# Patient Record
Sex: FEMALE | Race: WHITE | Employment: STUDENT | URBAN - METROPOLITAN AREA
[De-identification: names, ages, dates, MRNs, and addresses within clinical notes are randomized per-mention and may not be internally consistent; named-entity substitution may affect disease eponyms.]

---

## 2017-01-24 ENCOUNTER — OFFICE VISIT (OUTPATIENT)
Dept: FAMILY MEDICINE CLINIC | Facility: CLINIC | Age: 37
End: 2017-01-24

## 2017-01-24 ENCOUNTER — TELEPHONE (OUTPATIENT)
Dept: FAMILY MEDICINE CLINIC | Facility: CLINIC | Age: 37
End: 2017-01-24

## 2017-01-24 VITALS
DIASTOLIC BLOOD PRESSURE: 76 MMHG | SYSTOLIC BLOOD PRESSURE: 108 MMHG | HEART RATE: 83 BPM | WEIGHT: 139 LBS | BODY MASS INDEX: 28 KG/M2 | TEMPERATURE: 98 F

## 2017-01-24 DIAGNOSIS — J01.00 ACUTE MAXILLARY SINUSITIS, RECURRENCE NOT SPECIFIED: Primary | ICD-10-CM

## 2017-01-24 DIAGNOSIS — J02.9 SORE THROAT: ICD-10-CM

## 2017-01-24 LAB
CONTROL LINE PRESENT WITH A CLEAR BACKGROUND (YES/NO): YES YES/NO
KIT LOT #: NORMAL NUMERIC
STREP GRP A CUL-SCR: NEGATIVE

## 2017-01-24 PROCEDURE — 87880 STREP A ASSAY W/OPTIC: CPT | Performed by: FAMILY MEDICINE

## 2017-01-24 PROCEDURE — 99214 OFFICE O/P EST MOD 30 MIN: CPT | Performed by: FAMILY MEDICINE

## 2017-01-24 PROCEDURE — 99212 OFFICE O/P EST SF 10 MIN: CPT | Performed by: FAMILY MEDICINE

## 2017-01-24 RX ORDER — ONDANSETRON 4 MG/1
4 TABLET, ORALLY DISINTEGRATING ORAL EVERY 8 HOURS PRN
Qty: 10 TABLET | Refills: 0 | Status: SHIPPED | OUTPATIENT
Start: 2017-01-24 | End: 2017-07-08 | Stop reason: ALTCHOICE

## 2017-01-24 RX ORDER — ERYTHROMYCIN 500 MG/1
500 TABLET, COATED ORAL
Qty: 20 TABLET | Refills: 0 | Status: SHIPPED | OUTPATIENT
Start: 2017-01-24 | End: 2017-07-08 | Stop reason: ALTCHOICE

## 2017-01-24 RX ORDER — BENZONATATE 200 MG/1
200 CAPSULE ORAL 3 TIMES DAILY PRN
Qty: 30 CAPSULE | Refills: 0 | Status: SHIPPED | OUTPATIENT
Start: 2017-01-24 | End: 2017-07-08 | Stop reason: ALTCHOICE

## 2017-01-24 NOTE — PROGRESS NOTES
2017 11:34 AM    Josh Medina, : 1980  Patient presents with:  Sinus Problem: X 3 weeks off and on  Cough  Sore Throat      HPI:     Josh Medina is a 31year old female who presents for evaluation of a chief complaint of runny nose, sore th reviewed. No pertinent past surgical history.     Social History:     Social History   Marital Status: Unknown  Spouse Name: N/A    Years of Education: N/A  Number of Children: N/A     Occupational History  None on file     Social History Main Topics   Smok supple, no adenopathy, no thyromegaly  CARDIO: RRR without murmur  EXTREMITIES: no cyanosis, clubbing or edema  GI: soft, non-tender, normal bowel sounds  HEAD: normocephalic, atraumatic  EYES: sclera non icteric bilateral, conjunctiva clear  EARS: TM  luis felipe Encounter  POC Rapid Strep [id number]      RECOMMENDATIONS given include: Please, call our office with any questions or concerns. Notify the doctor if there is a deterioration or worsening of the medical condition.  Also, inform the doctor with any new symptom

## 2017-01-24 NOTE — TELEPHONE ENCOUNTER
Pt calling regarding having a questing on direction for erythromycin base 500 MG Oral Tab. Pt state that she was told to take 2 tablets twice a day but direction on the bottle says take 1 tablet 4 time a day. Pt would like to know which way is correct. Manjinder Castrejon

## 2017-01-25 NOTE — TELEPHONE ENCOUNTER
Pt is calling back state that she will be available at 11:30 pt state if she don't answer please leave the instruction on how to take the medication on her VM please

## 2017-01-25 NOTE — TELEPHONE ENCOUNTER
Pt. Calling back requesting to speak to RN to get clarification on directions for taking antibiotic.

## 2017-02-15 ENCOUNTER — TELEPHONE (OUTPATIENT)
Dept: GASTROENTEROLOGY | Facility: CLINIC | Age: 37
End: 2017-02-15

## 2017-02-15 NOTE — TELEPHONE ENCOUNTER
Pt states that she needs to be seen sooner than first available to review balsalazide medication. She states she has been tapering down medication and is taking two pills three times daily and is doing well. Please call.

## 2017-02-15 NOTE — TELEPHONE ENCOUNTER
Dr. Elton Lawson see below. Pt was seen in [address] [facility] on 10. 7.16 (notes below). This would be 4 month follow-up at this point. There are no current openings. Would you like to add on to a 15 min slot on FRI 2.24.17 at 11:30? Pls advise.      Copied per OV note

## 2017-03-27 RX ORDER — BALSALAZIDE DISODIUM 750 MG/1
1500 CAPSULE ORAL 3 TIMES DAILY
Qty: 180 CAPSULE | Refills: 2 | Status: SHIPPED | OUTPATIENT
Start: 2017-03-27 | End: 2017-08-11

## 2017-03-27 NOTE — TELEPHONE ENCOUNTER
Pending Prescriptions Disp Refills    BALSALAZIDE DISODIUM 750 MG Oral Cap [Pharmacy Med Name: BALSALAZIDE DISODIUM 750 MG CP] 270 capsule 5     Sig: TAKE 3 CAPSULES BY MOUTH 3 TIMES A DAY         See refill request  Patient last seen 10-7-16.    [facility]

## 2017-03-27 NOTE — TELEPHONE ENCOUNTER
Pt contacted and made aware of the medication dosage change, she states she had already discussed this with MD and was already aware. She accepted f/u appt with [facility] on 6/16/17 @1:30pm, directions provided to the [facility] [employer].       Refill Request        Conner Michel

## 2017-06-16 ENCOUNTER — OFFICE VISIT (OUTPATIENT)
Dept: GASTROENTEROLOGY | Facility: CLINIC | Age: 37
End: 2017-06-16

## 2017-06-16 VITALS
BODY MASS INDEX: 25.44 KG/M2 | DIASTOLIC BLOOD PRESSURE: 70 MMHG | HEIGHT: 60.5 IN | WEIGHT: 133 LBS | SYSTOLIC BLOOD PRESSURE: 98 MMHG | HEART RATE: 87 BPM

## 2017-06-16 DIAGNOSIS — K51.811 OTHER ULCERATIVE COLITIS WITH RECTAL BLEEDING (HCC): ICD-10-CM

## 2017-06-16 DIAGNOSIS — R11.2 NAUSEA AND VOMITING, INTRACTABILITY OF VOMITING NOT SPECIFIED, UNSPECIFIED VOMITING TYPE: Primary | ICD-10-CM

## 2017-06-16 PROCEDURE — 99213 OFFICE O/P EST LOW 20 MIN: CPT | Performed by: INTERNAL MEDICINE

## 2017-06-16 PROCEDURE — 99212 OFFICE O/P EST SF 10 MIN: CPT | Performed by: INTERNAL MEDICINE

## 2017-06-16 RX ORDER — NORGESTIMATE AND ETHINYL ESTRADIOL 7DAYSX3 28
KIT ORAL
Refills: 6 | COMMUNITY
Start: 2017-05-26 | End: 2018-05-31 | Stop reason: ALTCHOICE

## 2017-06-16 RX ORDER — ONDANSETRON 4 MG/1
4 TABLET, ORALLY DISINTEGRATING ORAL EVERY 12 HOURS PRN
Qty: 60 TABLET | Refills: 1 | Status: SHIPPED | OUTPATIENT
Start: 2017-06-16 | End: 2017-07-08 | Stop reason: ALTCHOICE

## 2017-06-16 RX ORDER — POLYETHYLENE GLYCOL 3350, SODIUM CHLORIDE, SODIUM BICARBONATE, POTASSIUM CHLORIDE 420; 11.2; 5.72; 1.48 G/4L; G/4L; G/4L; G/4L
POWDER, FOR SOLUTION ORAL
Qty: 1 BOTTLE | Refills: 0 | Status: SHIPPED | OUTPATIENT
Start: 2017-06-16 | End: 2017-07-08 | Stop reason: ALTCHOICE

## 2017-06-16 RX ORDER — ALPRAZOLAM 0.25 MG/1
TABLET ORAL
Refills: 0 | COMMUNITY
Start: 2017-04-27

## 2017-06-16 NOTE — PATIENT INSTRUCTIONS
1. Schedule colonoscopy with IV sedation    2.  bowel prep from pharmacy (split trilyte)    3. Continue all medications for procedure    4. Read all bowel prep instructions carefully    5.  AVOID seeds, nuts, popcorn, raw fruits and vegetables (ahrrison

## 2017-06-16 NOTE — PROGRESS NOTES
[facility] Follow-up Visit    Shanthi crypt abscess  -sigmoid/rectum:  arked chronic active colitis with acute cryptitis, crypt abscess    Soc:  -No smoking/etoh  -has 4 children Dannial Overall is youngest)    Imaging:  CT A/P (6/29/16)  -spleen, panc, adrenals kidneys unremarkable  -diffuse thickeni Oral Tablet Dispersible Take 1 tablet (4 mg total) by mouth every 8 (eight) hours as needed for Nausea. Disp: 10 tablet Rfl: 0   Dicyclomine HCl 10 MG Oral Cap Take 1 capsule (10 mg total) by mouth 3 (three) times daily as needed (cramping).  Disp: 90 capsu of  L sided ulcerative colitis (dx 7/2016). She has only been tx with IV/PO steroids and now on balsalazide 750mg PO (2 tabs TID). Cdiff was tx few months ago. Currently entirely asx, having formed BM without bloody.  TPMT (normal genotype/enzyme activity) Referrals:  US ABDOMEN LIMITED (CPT=[id number])     6/16/2017    DISHA Reese MD  Pager: [phone]

## 2017-06-19 ENCOUNTER — TELEPHONE (OUTPATIENT)
Dept: GASTROENTEROLOGY | Facility: CLINIC | Age: 37
End: 2017-06-19

## 2017-06-19 NOTE — TELEPHONE ENCOUNTER
Scheduled for:  Colonoscopy 32312  Provider Name: Rajeev Munoz  Date:  Zackery Mcgee 7/18/17  Location:  [facility]  Sedation:  IV  Time:  10;00 am  Prep: split dose trilyte  Meds/Allergies Reconciled?:  PCN, sertraline, vicodin  Diagnosis with codes:  Ulcerative colitis with re

## 2017-07-08 ENCOUNTER — LAB ENCOUNTER (OUTPATIENT)
Dept: LAB | Age: 37
End: 2017-07-08
Attending: INTERNAL MEDICINE
Payer: COMMERCIAL

## 2017-07-08 ENCOUNTER — OFFICE VISIT (OUTPATIENT)
Dept: FAMILY MEDICINE CLINIC | Facility: CLINIC | Age: 37
End: 2017-07-08

## 2017-07-08 VITALS
TEMPERATURE: 98 F | BODY MASS INDEX: 25 KG/M2 | HEART RATE: 92 BPM | SYSTOLIC BLOOD PRESSURE: 113 MMHG | DIASTOLIC BLOOD PRESSURE: 76 MMHG | WEIGHT: 132 LBS

## 2017-07-08 DIAGNOSIS — L42 PITYRIASIS ROSEA: Primary | ICD-10-CM

## 2017-07-08 DIAGNOSIS — K51.811 OTHER ULCERATIVE COLITIS WITH RECTAL BLEEDING (HCC): ICD-10-CM

## 2017-07-08 DIAGNOSIS — R11.2 NAUSEA AND VOMITING, INTRACTABILITY OF VOMITING NOT SPECIFIED, UNSPECIFIED VOMITING TYPE: ICD-10-CM

## 2017-07-08 DIAGNOSIS — B36.0 TINEA VERSICOLOR: ICD-10-CM

## 2017-07-08 LAB
ALBUMIN SERPL BCP-MCNC: 3.7 G/DL (ref 3.5–4.8)
ALBUMIN/GLOB SERPL: 1.2 {RATIO} (ref 1–2)
ALP SERPL-CCNC: 41 U/L (ref 32–100)
ALT SERPL-CCNC: 10 U/L (ref 14–54)
ANION GAP SERPL CALC-SCNC: 6 MMOL/L (ref 0–18)
AST SERPL-CCNC: 14 U/L (ref 15–41)
BASOPHILS # BLD: 0 K/UL (ref 0–0.2)
BASOPHILS NFR BLD: 1 %
BILIRUB SERPL-MCNC: 0.4 MG/DL (ref 0.3–1.2)
BUN SERPL-MCNC: 6 MG/DL (ref 8–20)
BUN/CREAT SERPL: 8.2 (ref 10–20)
CALCIUM SERPL-MCNC: 8.8 MG/DL (ref 8.5–10.5)
CHLORIDE SERPL-SCNC: 103 MMOL/L (ref 95–110)
CO2 SERPL-SCNC: 27 MMOL/L (ref 22–32)
CREAT SERPL-MCNC: 0.73 MG/DL (ref 0.5–1.5)
EOSINOPHIL # BLD: 0.1 K/UL (ref 0–0.7)
EOSINOPHIL NFR BLD: 2 %
ERYTHROCYTE [DISTWIDTH] IN BLOOD BY AUTOMATED COUNT: 13 % (ref 11–15)
GLOBULIN PLAS-MCNC: 3.2 G/DL (ref 2.5–3.7)
GLUCOSE SERPL-MCNC: 85 MG/DL (ref 70–99)
HCT VFR BLD AUTO: 37.8 % (ref 35–48)
HGB BLD-MCNC: 12.6 G/DL (ref 12–16)
LYMPHOCYTES # BLD: 1.9 K/UL (ref 1–4)
LYMPHOCYTES NFR BLD: 51 %
MCH RBC QN AUTO: 29.3 PG (ref 27–32)
MCHC RBC AUTO-ENTMCNC: 33.4 G/DL (ref 32–37)
MCV RBC AUTO: 87.7 FL (ref 80–100)
MONOCYTES # BLD: 0.3 K/UL (ref 0–1)
MONOCYTES NFR BLD: 7 %
NEUTROPHILS # BLD AUTO: 1.5 K/UL (ref 1.8–7.7)
NEUTROPHILS NFR BLD: 40 %
OSMOLALITY UR CALC.SUM OF ELEC: 279 MOSM/KG (ref 275–295)
PLATELET # BLD AUTO: 221 K/UL (ref 140–400)
PMV BLD AUTO: 9.8 FL (ref 7.4–10.3)
POTASSIUM SERPL-SCNC: 4 MMOL/L (ref 3.3–5.1)
PROT SERPL-MCNC: 6.9 G/DL (ref 5.9–8.4)
RBC # BLD AUTO: 4.31 M/UL (ref 3.7–5.4)
SODIUM SERPL-SCNC: 136 MMOL/L (ref 136–144)
VIT B12 SERPL-MCNC: 360 PG/ML (ref 181–914)
WBC # BLD AUTO: 3.7 K/UL (ref 4–11)

## 2017-07-08 PROCEDURE — 99212 OFFICE O/P EST SF 10 MIN: CPT | Performed by: FAMILY MEDICINE

## 2017-07-08 PROCEDURE — 36415 COLL VENOUS BLD VENIPUNCTURE: CPT

## 2017-07-08 PROCEDURE — 82607 VITAMIN B-12: CPT

## 2017-07-08 PROCEDURE — 87340 HEPATITIS B SURFACE AG IA: CPT

## 2017-07-08 PROCEDURE — 82306 VITAMIN D 25 HYDROXY: CPT

## 2017-07-08 PROCEDURE — 85025 COMPLETE CBC W/AUTO DIFF WBC: CPT

## 2017-07-08 PROCEDURE — 99214 OFFICE O/P EST MOD 30 MIN: CPT | Performed by: FAMILY MEDICINE

## 2017-07-08 PROCEDURE — 80053 COMPREHEN METABOLIC PANEL: CPT

## 2017-07-08 RX ORDER — EPINEPHRINE 0.3 MG/.3ML
0.3 INJECTION SUBCUTANEOUS ONCE
Qty: 1 EACH | Refills: 3 | Status: SHIPPED | OUTPATIENT
Start: 2017-07-08 | End: 2017-07-08

## 2017-07-08 RX ORDER — KETOCONAZOLE 20 MG/G
CREAM TOPICAL
Qty: 60 G | Refills: 1 | Status: SHIPPED | OUTPATIENT
Start: 2017-07-08 | End: 2017-07-08

## 2017-07-08 RX ORDER — KETOCONAZOLE 20 MG/G
CREAM TOPICAL
Qty: 60 G | Refills: 1 | Status: SHIPPED | OUTPATIENT
Start: 2017-07-08 | End: 2018-10-09

## 2017-07-08 NOTE — PROGRESS NOTES
7/8/2017  11:58 AM    Zahra Crowell is a 30year old female. Chief complaint(s): Patient presents with:  Rash: diffuse areas she has seen a dermatologist in the past but rash keeps getting worse.      HPI:     Zahra Crowell primary complaint is regar Medications (Active prior to today's visit):    Current Outpatient Prescriptions:  Halobetasol Propionate 0.05 % External Cream Apply to affected area(s) BID. Disp: 60 g Rfl: 1   ketoconazole 2 % External Cream Apply to affected area BID.  Buttocks and normal.   Left Ear: External ear normal.   Nose: No rhinorrhea. Mouth/Throat: Oropharynx is clear and moist.   Eyes: Conjunctivae are normal.   Neck: Neck supple. Cardiovascular: Normal rate and regular rhythm.     Pulmonary/Chest: Effort normal and uzma 2. Tinea versicolor  MEDICATIONS:     Signed Prescriptions Disp Refills    Halobetasol Propionate 0.05 % External Cream 60 g 1      Sig: Apply to affected area(s) BID.      ketoconazole 2 % External Cream 60 g 1      Sig: Apply to affected area BID.

## 2017-07-10 ENCOUNTER — TELEPHONE (OUTPATIENT)
Dept: FAMILY MEDICINE CLINIC | Facility: CLINIC | Age: 37
End: 2017-07-10

## 2017-07-10 LAB
25(OH)D3 SERPL-MCNC: 23.5 NG/ML
HBV SURFACE AG SERPL QL IA: NONREACTIVE

## 2017-07-10 NOTE — TELEPHONE ENCOUNTER
Actions Requested: advice needed  Problem: rash is spreading since recent office visit  Onset and Timin weeks  Associated Symptoms: red areas spreading to abdomen, legs, feet, nausea, fatigue  Aggravating by: n/a  Alleviated by: cold oatmeal baths  Tri

## 2017-07-10 NOTE — TELEPHONE ENCOUNTER
Patient states she was told by pharmacy that this med needs prior authorization    Halobetasol Propionate 0.05 % External Cream 60 g 1 7/8/2017    Sig :  Apply to affected area(s) BID.      Route:   (none)     Order #: [id number]

## 2017-07-11 ENCOUNTER — TELEPHONE (OUTPATIENT)
Dept: GASTROENTEROLOGY | Facility: CLINIC | Age: 37
End: 2017-07-11

## 2017-07-11 RX ORDER — ERGOCALCIFEROL 1.25 MG/1
50000 CAPSULE ORAL
Qty: 8 CAPSULE | Refills: 0 | Status: SHIPPED | OUTPATIENT
Start: 2017-07-11 | End: 2017-08-26

## 2017-07-11 NOTE — TELEPHONE ENCOUNTER
D/W patient --> low Vit D, will replace with weekly vit D therapy. She has a rash which she says comes and goes, has had it in the past. Is going to see dermatologist. No SOB. Okay to get c-scope next week.

## 2017-07-11 NOTE — TELEPHONE ENCOUNTER
Dr. Rivas Ser called pharmacy and they stated Merril May is a similar medication to halobetasol propionate.  Please advise on sending prescription for patient to her pharmacy

## 2017-07-11 NOTE — TELEPHONE ENCOUNTER
Spoke to pt, informed her of Dr. bhakta. She states that she did ask for the soonest appt and has called every office that was given to her by her insurance company but has not been able to get an appt sooner than 8/28.  Pt is on a waiting list at

## 2017-07-13 NOTE — TELEPHONE ENCOUNTER
Called [facility] pharmacy and RM changed medication to Fluocinonide 005% cream. No PA required.

## 2017-07-18 ENCOUNTER — HOSPITAL ENCOUNTER (OUTPATIENT)
Facility: HOSPITAL | Age: 37
Setting detail: HOSPITAL OUTPATIENT SURGERY
Discharge: HOME | End: 2017-07-18
Attending: INTERNAL MEDICINE | Admitting: INTERNAL MEDICINE
Payer: COMMERCIAL

## 2017-07-18 ENCOUNTER — SURGERY (OUTPATIENT)
Age: 37
End: 2017-07-18

## 2017-07-18 DIAGNOSIS — K62.5 RECTAL BLEEDING: ICD-10-CM

## 2017-07-18 DIAGNOSIS — K51.90 ULCERATIVE COLITIS (HCC): ICD-10-CM

## 2017-07-18 LAB — B-HCG UR QL: NEGATIVE

## 2017-07-18 PROCEDURE — 0DBK8ZX EXCISION OF ASCENDING COLON, VIA NATURAL OR ARTIFICIAL OPENING ENDOSCOPIC, DIAGNOSTIC: ICD-10-PCS | Performed by: INTERNAL MEDICINE

## 2017-07-18 PROCEDURE — 0DBG8ZX EXCISION OF LEFT LARGE INTESTINE, VIA NATURAL OR ARTIFICIAL OPENING ENDOSCOPIC, DIAGNOSTIC: ICD-10-PCS | Performed by: INTERNAL MEDICINE

## 2017-07-18 PROCEDURE — 45385 COLONOSCOPY W/LESION REMOVAL: CPT | Performed by: INTERNAL MEDICINE

## 2017-07-18 PROCEDURE — 99152 MOD SED SAME PHYS/QHP 5/>YRS: CPT | Performed by: INTERNAL MEDICINE

## 2017-07-18 PROCEDURE — 0DBF8ZX EXCISION OF RIGHT LARGE INTESTINE, VIA NATURAL OR ARTIFICIAL OPENING ENDOSCOPIC, DIAGNOSTIC: ICD-10-PCS | Performed by: INTERNAL MEDICINE

## 2017-07-18 RX ORDER — SODIUM CHLORIDE 0.9 % (FLUSH) 0.9 %
10 SYRINGE (ML) INJECTION AS NEEDED
Status: DISCONTINUED | OUTPATIENT
Start: 2017-07-18 | End: 2017-07-18

## 2017-07-18 RX ORDER — SODIUM CHLORIDE, SODIUM LACTATE, POTASSIUM CHLORIDE, CALCIUM CHLORIDE 600; 310; 30; 20 MG/100ML; MG/100ML; MG/100ML; MG/100ML
INJECTION, SOLUTION INTRAVENOUS CONTINUOUS
Status: DISCONTINUED | OUTPATIENT
Start: 2017-07-18 | End: 2017-07-18

## 2017-07-18 RX ORDER — BALSALAZIDE DISODIUM 750 MG/1
1500 CAPSULE ORAL 3 TIMES DAILY
COMMUNITY
End: 2017-08-11

## 2017-07-18 RX ORDER — MIDAZOLAM HYDROCHLORIDE 1 MG/ML
1 INJECTION INTRAMUSCULAR; INTRAVENOUS EVERY 5 MIN PRN
Status: DISCONTINUED | OUTPATIENT
Start: 2017-07-18 | End: 2017-07-18

## 2017-07-18 RX ORDER — MIDAZOLAM HYDROCHLORIDE 1 MG/ML
INJECTION INTRAMUSCULAR; INTRAVENOUS
Status: DISCONTINUED | OUTPATIENT
Start: 2017-07-18 | End: 2017-07-18

## 2017-07-18 NOTE — H&P
History & Physical Examination    Patient Name: Zoya Roque  MRN: [id number]  [facility]: [id number]  YOB: 1980    Diagnosis: ulcerative colitis      Prescriptions Prior to Admission:  Balsalazide Disodium 750 MG Oral Cap Take 2,250 mg by mouth Anxiety  Vicodin [Hydrocodon*    Hives    Comment:hives    Past Medical History:   Diagnosis Date   • Anxiety    • Clostridium difficile colitis 7/2016   • Colitis    • Ulcerative colitis ([facility] [address]      No past surgical history on file.   Family History   Probl

## 2017-07-18 NOTE — OPERATIVE REPORT
COLONOSCOPY REPORT    Xander Kyle     1980 Age 30year old   PCP Denise Shook MD Endoscopist Anam Cui MD     Date of procedure: 17    Procedure: Colonoscopy w/biopsy, cold snare polypectomy    Pre-operative diagnosis: Hi tolerated the procedure well. Complications: none. Findings:   1. One polyp(s) noted as follows:      A. 4 mm polyp in the ascending colon; flat morphology; cold snare polypectomy and retrieved.     2. Diverticulosis: two diverticulum were noted in t

## 2017-07-19 VITALS
DIASTOLIC BLOOD PRESSURE: 66 MMHG | SYSTOLIC BLOOD PRESSURE: 101 MMHG | OXYGEN SATURATION: 99 % | HEIGHT: 58 IN | RESPIRATION RATE: 17 BRPM | HEART RATE: 60 BPM | BODY MASS INDEX: 27.29 KG/M2 | WEIGHT: 130 LBS

## 2017-07-24 ENCOUNTER — TELEPHONE (OUTPATIENT)
Dept: GASTROENTEROLOGY | Facility: CLINIC | Age: 37
End: 2017-07-24

## 2017-07-24 NOTE — TELEPHONE ENCOUNTER
Left msg for patient re: path results from showing showing no colitis. Polyp removed is hyperplastic. Repeat CLN in 3-5 years.

## 2017-08-11 ENCOUNTER — HOSPITAL ENCOUNTER (OUTPATIENT)
Age: 37
Discharge: HOME | End: 2017-08-11
Attending: EMERGENCY MEDICINE
Payer: COMMERCIAL

## 2017-08-11 VITALS
HEIGHT: 59 IN | DIASTOLIC BLOOD PRESSURE: 76 MMHG | WEIGHT: 133 LBS | TEMPERATURE: 99 F | HEART RATE: 88 BPM | OXYGEN SATURATION: 98 % | BODY MASS INDEX: 26.81 KG/M2 | RESPIRATION RATE: 18 BRPM | SYSTOLIC BLOOD PRESSURE: 114 MMHG

## 2017-08-11 DIAGNOSIS — H10.33 ACUTE CONJUNCTIVITIS OF BOTH EYES, UNSPECIFIED ACUTE CONJUNCTIVITIS TYPE: Primary | ICD-10-CM

## 2017-08-11 PROCEDURE — 99213 OFFICE O/P EST LOW 20 MIN: CPT

## 2017-08-11 RX ORDER — BALSALAZIDE DISODIUM 750 MG/1
1500 CAPSULE ORAL 3 TIMES DAILY
Qty: 180 CAPSULE | Refills: 2 | Status: SHIPPED | OUTPATIENT
Start: 2017-08-11 | End: 2017-11-15

## 2017-08-11 RX ORDER — GENTAMICIN SULFATE 3 MG/ML
2 SOLUTION/ DROPS OPHTHALMIC 4 TIMES DAILY
Qty: 5 ML | Refills: 1 | Status: SHIPPED | OUTPATIENT
Start: 2017-08-11 | End: 2017-08-16

## 2017-08-11 NOTE — TELEPHONE ENCOUNTER
Dr. Cedeño Chi rx request for Balsalazide Disodium 750 mg, Please review. Thank you. LOV: 6/16/17  Last Refill: Rx request Filled by outside Provider.

## 2017-08-11 NOTE — ED PROVIDER NOTES
Patient Seen in: [facility] In [address] [place] [address]    History   Patient presents with: Eye Visual Problem (opthalmic)    Stated Complaint: itchy eyes    HPI    Pt complains of b eye itching, redness and drainage  days. Pain is min.     Lethea Spark Comment: restarted smoking 3 months ago  Alcohol use: Yes           0.0 oz/week     Comment: OCC. Review of Systems    Positive for stated complaint: itchy eyes  Other systems are as noted in HPI.   Constitutional and vital signs rev

## 2017-08-11 NOTE — ED INITIAL ASSESSMENT (HPI)
Presents with bilateral eye itchiness since yesterday. Denies drainage, foreign body sensation. Pt states \"it's worse than my normal allergies. \" Denies new soaps, lotions, foods. Denies blurred vision or changes to vision. Denies cough or URI symptoms.

## 2017-08-28 ENCOUNTER — TELEPHONE (OUTPATIENT)
Dept: GASTROENTEROLOGY | Facility: CLINIC | Age: 37
End: 2017-08-28

## 2017-08-28 RX ORDER — ERGOCALCIFEROL 1.25 MG/1
50000 CAPSULE ORAL
Qty: 8 CAPSULE | Refills: 0 | Status: SHIPPED | OUTPATIENT
Start: 2017-08-28 | End: 2017-10-17

## 2017-08-28 NOTE — TELEPHONE ENCOUNTER
Pending Prescriptions Disp Refills    ERGOCALCIFEROL 82917 units Oral Cap [Pharmacy Med Name: VIT D2 1.25 MG (50,000 UNIT)] 8 capsule 0     Sig: TAKE 1 CAPSULE (50,000 UNITS TOTAL) BY MOUTH EVERY 7 DAYS.          See refill request  Patient last seen 6-16

## 2017-08-28 NOTE — TELEPHONE ENCOUNTER
Pt requesting Vit D results to be sent to her Dermatologist. Rex Mesa call pt at:[phone],thanks.

## 2017-09-27 ENCOUNTER — TELEPHONE (OUTPATIENT)
Dept: FAMILY MEDICINE CLINIC | Facility: CLINIC | Age: 37
End: 2017-09-27

## 2017-09-27 NOTE — TELEPHONE ENCOUNTER
Phone call made no answer left VM that pt needs to schedule an appt so that Dr Benson Paige can place the orders for her TB test. 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  BRITTANIE please help schedule appt if patient

## 2017-09-27 NOTE — TELEPHONE ENCOUNTER
Pt. requesting to get a copy of her px and Immunization record. Pt. Would like to p/up at [facility].

## 2017-09-27 NOTE — TELEPHONE ENCOUNTER
Everything will be addressed at her appt with Silvio Harris.  On previous message patient was left a detailed message to make sure she brings with her immunization record and forms from nursing program.

## 2017-09-27 NOTE — TELEPHONE ENCOUNTER
Pt made an appt with Santos ALEXANDER. Please advise   Pt will need a TB test tomorrow along with her px.

## 2017-09-28 ENCOUNTER — LAB ENCOUNTER (OUTPATIENT)
Dept: LAB | Age: 37
End: 2017-09-28
Attending: NURSE PRACTITIONER
Payer: COMMERCIAL

## 2017-09-28 ENCOUNTER — OFFICE VISIT (OUTPATIENT)
Dept: FAMILY MEDICINE CLINIC | Facility: CLINIC | Age: 37
End: 2017-09-28

## 2017-09-28 ENCOUNTER — TELEPHONE (OUTPATIENT)
Dept: FAMILY MEDICINE CLINIC | Facility: CLINIC | Age: 37
End: 2017-09-28

## 2017-09-28 VITALS
WEIGHT: 141 LBS | SYSTOLIC BLOOD PRESSURE: 109 MMHG | HEIGHT: 60 IN | DIASTOLIC BLOOD PRESSURE: 65 MMHG | BODY MASS INDEX: 27.68 KG/M2 | HEART RATE: 76 BPM

## 2017-09-28 DIAGNOSIS — Z11.1 SCREENING-PULMONARY TB: ICD-10-CM

## 2017-09-28 DIAGNOSIS — Z00.00 WELL ADULT EXAM: Primary | ICD-10-CM

## 2017-09-28 DIAGNOSIS — Z72.0 TOBACCO USE: ICD-10-CM

## 2017-09-28 DIAGNOSIS — K51.00 ULCERATIVE PANCOLITIS WITHOUT COMPLICATION (HCC): ICD-10-CM

## 2017-09-28 DIAGNOSIS — L21.0 PITYRIASIS: ICD-10-CM

## 2017-09-28 DIAGNOSIS — Z00.00 WELL ADULT EXAM: ICD-10-CM

## 2017-09-28 DIAGNOSIS — Z91.030 BEE STING ALLERGY: ICD-10-CM

## 2017-09-28 PROBLEM — Z23 NEEDS FLU SHOT: Status: ACTIVE | Noted: 2017-09-28

## 2017-09-28 LAB — RUBV IGG SER-ACNC: 20.3 IU/ML

## 2017-09-28 PROCEDURE — 86480 TB TEST CELL IMMUN MEASURE: CPT

## 2017-09-28 PROCEDURE — 86735 MUMPS ANTIBODY: CPT

## 2017-09-28 PROCEDURE — 90471 IMMUNIZATION ADMIN: CPT | Performed by: NURSE PRACTITIONER

## 2017-09-28 PROCEDURE — 90686 IIV4 VACC NO PRSV 0.5 ML IM: CPT | Performed by: NURSE PRACTITIONER

## 2017-09-28 PROCEDURE — 86762 RUBELLA ANTIBODY: CPT

## 2017-09-28 PROCEDURE — 99395 PREV VISIT EST AGE 18-39: CPT | Performed by: NURSE PRACTITIONER

## 2017-09-28 PROCEDURE — 86765 RUBEOLA ANTIBODY: CPT

## 2017-09-28 PROCEDURE — 36415 COLL VENOUS BLD VENIPUNCTURE: CPT

## 2017-09-28 PROCEDURE — 86787 VARICELLA-ZOSTER ANTIBODY: CPT

## 2017-09-28 RX ORDER — BALSALAZIDE DISODIUM 750 MG/1
2250 CAPSULE ORAL 3 TIMES DAILY
COMMUNITY
End: 2017-11-16

## 2017-09-28 NOTE — TELEPHONE ENCOUNTER
Pt at the lab and is wondering if she can get the MMR blood draw while she is at the lab. .. please advise

## 2017-09-28 NOTE — PROGRESS NOTES
HPI  Pt here for nursing school physical.  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 Needs flu shot. Is currently under care of GI and derm. No acute complaints at this time. Review of Systems   Constitutional: Negative. HENT: Negative. Eyes: Negative.     Respirato OCP     Other Topics Concern   None on file     Social History Narrative   None on file         Current Outpatient Prescriptions:  Balsalazide Disodium 750 MG Oral Cap Take 2,250 mg by mouth 3 (three) times daily.  Disp:  Rfl:    ERGOCALCIFEROL 57789 units thyromegaly present. Cardiovascular: Normal rate, regular rhythm, normal heart sounds and intact distal pulses. No murmur heard. Pulmonary/Chest: Effort normal and breath sounds normal. No respiratory distress. She has no wheezes. Abdominal: Soft.

## 2017-09-29 LAB
MEV IGG SER-ACNC: 173 AU/ML (ref 30–?)
MUV IGG SER IA-ACNC: 26.4 AU/ML (ref 11–?)
VZV IGG SER IA-ACNC: 670 (ref 165–?)

## 2017-11-16 NOTE — TELEPHONE ENCOUNTER
Pending Prescriptions Disp Refills    BALSALAZIDE DISODIUM 750 MG Oral Cap [Pharmacy Med Name: BALSALAZIDE DISODIUM 750 MG CP] 180 capsule 2     Sig: TAKE 2 CAPSULES (1,500 MG TOTAL) BY MOUTH 3 (THREE) TIMES DAILY.          See refill request  Patient las

## 2017-11-17 RX ORDER — BALSALAZIDE DISODIUM 750 MG/1
1500 CAPSULE ORAL 3 TIMES DAILY
Qty: 180 CAPSULE | Refills: 3 | Status: SHIPPED | OUTPATIENT
Start: 2017-11-17 | End: 2017-12-17

## 2017-11-17 NOTE — TELEPHONE ENCOUNTER
GI staff: please call patient and let her known I refilled her balsalazide medication. However she is [employer] and I will not be able to provide any more refills.  She needs to establish care with a new GI that is covered with [employer] or if she change

## 2017-11-20 NOTE — TELEPHONE ENCOUNTER
Pt contacted and informed that she is to f/u w/ new GI provider d/t Emeka. She states she is switching her plan and will update our office once she has a new card. No further questions at this time.

## 2018-03-28 RX ORDER — BALSALAZIDE DISODIUM 750 MG/1
CAPSULE ORAL
Qty: 180 CAPSULE | Refills: 0 | Status: SHIPPED | OUTPATIENT
Start: 2018-03-28 | End: 2018-05-27

## 2018-03-28 NOTE — TELEPHONE ENCOUNTER
Pending Prescriptions Disp Refills    BALSALAZIDE DISODIUM 750 MG Oral Cap [Pharmacy Med Name: BALSALAZIDE 750MG CAPSULES] 180 capsule 0     Sig: TAKE 2 CAPSULES BY MOUTH THREE TIMES DAILY         See refill request  Patient last seen 6-16-17.    Harry Morales

## 2018-05-29 RX ORDER — BALSALAZIDE DISODIUM 750 MG/1
CAPSULE ORAL
Qty: 180 CAPSULE | Refills: 0 | Status: SHIPPED | OUTPATIENT
Start: 2018-05-29

## 2018-05-29 NOTE — TELEPHONE ENCOUNTER
Pending Prescriptions Disp Refills    BALSALAZIDE DISODIUM 750 MG Oral Cap [Pharmacy Med Name: BALSALAZIDE 750MG CAPSULES] 180 capsule 0     Sig: TAKE 2 CAPSULES BY MOUTH THREE TIMES DAILY         See refill request  Patient last seen 6-16-17.    Rosemarie Auguste

## 2018-05-29 NOTE — TELEPHONE ENCOUNTER
GI staff:please have patient make appointment to see me in clinic (non-urgent). I refilled this medication now, but cannot provide future refills on medications unless he sees me in clinic, thanks.

## 2018-05-31 ENCOUNTER — HOSPITAL ENCOUNTER (OUTPATIENT)
Age: 38
Discharge: HOME | End: 2018-05-31
Attending: FAMILY MEDICINE
Payer: MEDICAID

## 2018-05-31 VITALS
SYSTOLIC BLOOD PRESSURE: 114 MMHG | HEART RATE: 77 BPM | BODY MASS INDEX: 30.24 KG/M2 | OXYGEN SATURATION: 100 % | TEMPERATURE: 98 F | HEIGHT: 59 IN | DIASTOLIC BLOOD PRESSURE: 78 MMHG | RESPIRATION RATE: 18 BRPM | WEIGHT: 150 LBS

## 2018-05-31 DIAGNOSIS — J06.9 VIRAL UPPER RESPIRATORY TRACT INFECTION: Primary | ICD-10-CM

## 2018-05-31 PROCEDURE — 99214 OFFICE O/P EST MOD 30 MIN: CPT

## 2018-05-31 PROCEDURE — 99213 OFFICE O/P EST LOW 20 MIN: CPT

## 2018-05-31 RX ORDER — CLARITHROMYCIN 500 MG/1
500 TABLET, COATED ORAL 2 TIMES DAILY
Qty: 20 TABLET | Refills: 0 | Status: SHIPPED | OUTPATIENT
Start: 2018-05-31 | End: 2018-06-10

## 2018-05-31 NOTE — ED PROVIDER NOTES
Patient Seen in: [facility] In [place]    History   CC:  Patient presents with:  Cough/URI    Stated Complaint: sinus issues    ------------------------------  Per Rn: (paraphrase)    Cough, congestion rhinorrhea x 4-5 d  --------- obvious abnormality, atraumatic   Eyes:    PERRL, conjunctiva/corneas clear, EOM's intact   Ears:    Normal TM's and external ear canals, both ears   Nose:   Nares normal, septum midline, mucosa normal, min drainage, clear, no sinus tenderness   Throat:

## 2018-05-31 NOTE — ED INITIAL ASSESSMENT (HPI)
c/o sinus pressure, congestion, runny nose, ears popping and constant sneezing x 4-5 days. No relief from otc meds, +occ'l cough.

## 2018-06-25 NOTE — TELEPHONE ENCOUNTER
See refill request  Patient last seen 6-16-17.    Medication last refilled 5-29-18    Pending Prescriptions Disp Refills    BALSALAZIDE DISODIUM 750 MG Oral Cap [Pharmacy Med Name: BALSALAZIDE 750MG CAPSULES] 180 capsule 0     Sig: TAKE 2 CAPSULES BY MOUTH THREE TIMES DAILY

## 2018-06-26 RX ORDER — BALSALAZIDE DISODIUM 750 MG/1
CAPSULE ORAL
Qty: 180 CAPSULE | Refills: 0 | OUTPATIENT
Start: 2018-06-26

## 2018-10-09 ENCOUNTER — OFFICE VISIT (OUTPATIENT)
Dept: FAMILY MEDICINE CLINIC | Facility: CLINIC | Age: 38
End: 2018-10-09
Payer: MEDICAID

## 2018-10-09 VITALS
HEART RATE: 98 BPM | TEMPERATURE: 99 F | SYSTOLIC BLOOD PRESSURE: 112 MMHG | HEIGHT: 60 IN | BODY MASS INDEX: 28.2 KG/M2 | DIASTOLIC BLOOD PRESSURE: 77 MMHG | WEIGHT: 143.63 LBS

## 2018-10-09 DIAGNOSIS — D72.819 LEUKOPENIA, UNSPECIFIED TYPE: ICD-10-CM

## 2018-10-09 DIAGNOSIS — Z90.49 STATUS POST APPENDECTOMY: Primary | ICD-10-CM

## 2018-10-09 PROCEDURE — 99212 OFFICE O/P EST SF 10 MIN: CPT | Performed by: FAMILY MEDICINE

## 2018-10-09 PROCEDURE — 99214 OFFICE O/P EST MOD 30 MIN: CPT | Performed by: FAMILY MEDICINE

## 2018-10-09 NOTE — PROGRESS NOTES
10/9/2018  2:30 PM    Zenaida Duncan is a 41year old female. Chief complaint(s): Patient presents with:  ER F/U: Patient had her apendix removed and days after returned to ER    HPI:     Zenaida Duncan primary complaint is regarding as above.      Orin Parson HIB                   06/05/1984 08/08/1984 05/07/1985 06/03/1986      IPV                   06/05/1984  08/08/1984  10/01/1985                            07/07/1987      MMR                   06/05/1984      TDAP TB Gold TB-NIL 0.007 IU/mL    Quantiferon TB Gold OBED-NIL >10.000 IU/mL    Quantiferon TB Result Negative Negative   RUBELLA, IGG   Result Value Ref Range    Rubella IgG Quantitative 20.3 >=15.0 IU/mL    Rubella IgG Immune Immune   RUBELLA ANTIBODIES, IGM

## 2019-02-01 ENCOUNTER — TELEPHONE (OUTPATIENT)
Dept: FAMILY MEDICINE CLINIC | Facility: CLINIC | Age: 39
End: 2019-02-01

## 2019-02-01 NOTE — TELEPHONE ENCOUNTER
Phoned pt and states that she cannot come in that day. That is the only day she works. Pt can come in any other day.

## 2019-02-01 NOTE — TELEPHONE ENCOUNTER
May offer an early Medicare slot this week if unable to come in then patient will have to wait till her scheduled appt.

## 2019-02-01 NOTE — TELEPHONE ENCOUNTER
Pt made an appt to see Dr. Arnulfo Vasquez on 2-28-19 for her physical. Pt would like to know if the doctor can see her sooner. Pt needs the physical for school.      Please reply to pool: ISAIAH Bernal

## 2019-03-16 ENCOUNTER — NURSE TRIAGE (OUTPATIENT)
Dept: OTHER | Age: 39
End: 2019-03-16

## 2019-03-16 NOTE — TELEPHONE ENCOUNTER
Action Requested: Summary for Provider     []  Critical Lab, Recommendations Needed  [] Need Additional Advice  []   FYI    []   Need Orders  [] Need Medications Sent to Pharmacy  []  Other     SUMMARY: pt states for the last three months her periods have

## 2019-03-16 NOTE — TELEPHONE ENCOUNTER
Would recommend evaluation in the office. Agree with appointment. We will see her then unless any symptoms of worsening abdominal pain or worsening menstrual bleeding. In that event she should go to the emergency room.

## 2019-03-16 NOTE — TELEPHONE ENCOUNTER
Left vm advising Dr WHITFIELD [facility] agreed should come in as scheduled, go to ED if worsening symptoms, call back if any other questions or concerns.

## 2019-03-18 ENCOUNTER — OFFICE VISIT (OUTPATIENT)
Dept: FAMILY MEDICINE CLINIC | Facility: CLINIC | Age: 39
End: 2019-03-18
Payer: MEDICAID

## 2019-03-18 VITALS
HEIGHT: 60 IN | BODY MASS INDEX: 25.72 KG/M2 | RESPIRATION RATE: 16 BRPM | DIASTOLIC BLOOD PRESSURE: 74 MMHG | WEIGHT: 131 LBS | HEART RATE: 90 BPM | SYSTOLIC BLOOD PRESSURE: 115 MMHG | TEMPERATURE: 99 F

## 2019-03-18 DIAGNOSIS — B34.9 ACUTE VIRAL SYNDROME: ICD-10-CM

## 2019-03-18 DIAGNOSIS — N92.6 IRREGULAR MENSTRUAL CYCLE: Primary | ICD-10-CM

## 2019-03-18 LAB
FLUAV + FLUBV RNA SPEC NAA+PROBE: NEGATIVE
FLUAV + FLUBV RNA SPEC NAA+PROBE: NEGATIVE
FLUAV + FLUBV RNA SPEC NAA+PROBE: POSITIVE

## 2019-03-18 PROCEDURE — 99213 OFFICE O/P EST LOW 20 MIN: CPT | Performed by: FAMILY MEDICINE

## 2019-03-18 PROCEDURE — 99212 OFFICE O/P EST SF 10 MIN: CPT | Performed by: FAMILY MEDICINE

## 2019-03-18 RX ORDER — ECHINACEA PURPUREA EXTRACT 125 MG
1 TABLET ORAL 4 TIMES DAILY PRN
Qty: 50 ML | Refills: 1 | Status: SHIPPED | OUTPATIENT
Start: 2019-03-18

## 2019-03-18 RX ORDER — BENZONATATE 200 MG/1
200 CAPSULE ORAL 3 TIMES DAILY PRN
Qty: 30 CAPSULE | Refills: 0 | Status: SHIPPED | OUTPATIENT
Start: 2019-03-18

## 2019-03-18 NOTE — PROGRESS NOTES
3/18/2019  2:10 PM    Zoya Roque is a 41year old female. Chief complaint(s): Patient presents with:  ER F/U: Patient c/o getting menstrual periods every 2 weeks since December 2018.  Pt states she went to [facility] ER on 3/16/19 for fee COLONOSCOPY N/A 7/18/2017    Performed by Cydney Live MD at [facility] ENDOSCOPY      Family History   Problem Relation Age of Onset   • Heart Disorder Father    • Other (Crohn's disease) Mother       Social History: Social History    Tobacco Use      Smokin 180 capsule Rfl: 0       Allergies:    Bee                     ANAPHYLAXIS  Amoxicillin             RASH  Insects                   Penicillins             SWELLING  Sertraline              ANXIETY  Vicodin [Hydrocodon*    HIVES    Comment:hives      ROS: encounter diagnosis)  Acute viral syndrome     1. Irregular menstrual cycle         RECOMMENDATIONS given include: Please, call our office with any questions or concerns.  Notify Dr Headley Sender or the [facility] if there is a deterioration or worsening of

## 2019-03-19 RX ORDER — OSELTAMIVIR PHOSPHATE 75 MG/1
75 CAPSULE ORAL 2 TIMES DAILY
Qty: 10 CAPSULE | Refills: 0 | Status: SHIPPED | OUTPATIENT
Start: 2019-03-19

## 2019-03-20 ENCOUNTER — TELEPHONE (OUTPATIENT)
Dept: FAMILY MEDICINE CLINIC | Facility: CLINIC | Age: 39
End: 2019-03-20

## 2019-03-20 NOTE — TELEPHONE ENCOUNTER
Pt advised of letter on mychart, pt will go back to the ER tonight if sxs recur, increase, change, or worsen, she is feeling better but would like ER follow up tomorrow. Appt given.

## 2019-03-20 NOTE — TELEPHONE ENCOUNTER
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   She is not feel

## 2019-03-20 NOTE — TELEPHONE ENCOUNTER
Pt called in stating that she has a letter allowing her to return to work on 3/21, but she does not think that she will be able to go back. She is asking for a new note to excuse her tomorrow as well.  She isn't sure when she should go back (next shift

## 2019-03-21 ENCOUNTER — OFFICE VISIT (OUTPATIENT)
Dept: FAMILY MEDICINE CLINIC | Facility: CLINIC | Age: 39
End: 2019-03-21
Payer: MEDICAID

## 2019-03-21 VITALS
HEIGHT: 60 IN | WEIGHT: 131 LBS | HEART RATE: 70 BPM | SYSTOLIC BLOOD PRESSURE: 111 MMHG | DIASTOLIC BLOOD PRESSURE: 75 MMHG | TEMPERATURE: 98 F | BODY MASS INDEX: 25.72 KG/M2

## 2019-03-21 DIAGNOSIS — K29.00 OTHER ACUTE GASTRITIS WITHOUT HEMORRHAGE: Primary | ICD-10-CM

## 2019-03-21 DIAGNOSIS — J11.1 INFLUENZA: ICD-10-CM

## 2019-03-21 DIAGNOSIS — L40.9 PSORIASIS: ICD-10-CM

## 2019-03-21 PROBLEM — K29.70 GASTRITIS: Status: ACTIVE | Noted: 2019-03-21

## 2019-03-21 PROCEDURE — 99214 OFFICE O/P EST MOD 30 MIN: CPT | Performed by: NURSE PRACTITIONER

## 2019-03-21 NOTE — PATIENT INSTRUCTIONS
Gastritis or Ulcer, No Antibiotic Treatment    Gastritis is irritation and inflammation of the stomach lining. This means the lining is red and swollen. It can cause shallow sores in the stomach lining called erosions.  An ulcer is a deeper open sore in t · Don't take any NSAIDs during your treatment. If you take NSAID to help treat other health problems, tell your healthcare provider. He or she may need to adjust your medicine plan or change the dosage. · Don’t use tobacco. Also don’t drink alcohol.  These

## 2019-03-21 NOTE — ASSESSMENT & PLAN NOTE
Stop tamilfu  Supportive care discussed to help alleviate symptoms  Please call if symptoms worsen or are not resolving.

## 2019-03-21 NOTE — ASSESSMENT & PLAN NOTE
Keep diet very light  Start bentyl and famotidine  Please call if symptoms worsen or are not resolving.

## (undated) DEVICE — FORCEP RADIAL JAW 4

## (undated) DEVICE — SNARE 9MM 230CM 2.4MM EXACTO

## (undated) DEVICE — ENDOSCOPY PACK - LOWER: Brand: MEDLINE INDUSTRIES, INC.

## (undated) DEVICE — TRAP 4 CPTR CHMBR N EZ INLN

## (undated) DEVICE — Device: Brand: DEFENDO AIR/WATER/SUCTION AND BIOPSY VALVE

## (undated) NOTE — LETTER
3/18/2019        To Whom It May Concern:    Tien Brown is currently under my medical care and may not return to work at this time. Please excuse Regan Ribera for 3 days. She may return to work on 03/21/19. Activity is restricted as follows: none.     If y

## (undated) NOTE — Clinical Note
1/24/2017          To Whom It May Concern:    Josh Medina is currently under my medical care and may not return to work at this time. Please excuse Dearl Hal for 1 days. She may return to work on 01/25/17. Activity is restricted as follows: none.     If y

## (undated) NOTE — LETTER
5/29/2018              [address] [place] [address]         Dear Mandy Ojeda,    A refill for balsalazide was authorized and given to your pharmacy on  5/29/18.  No additional refills will be able to be approved until we

## (undated) NOTE — MR AVS SNAPSHOT
[facility]  [address] [place] [id number] [phone]               Thank you for choosing us for your health care visit with Aicha Almonte MD.  We are glad to serve you and happy to provide you with this summary of your visit. bleeding [facility]) [K51.811]                 Scheduling Instructions     Friday June 16, 2017     Imaging:  US ABDOMEN LIMITED ([id number]=[id number])    Instructions:   To schedule a test at any [place] [address], call Central Scheduling at [phone] Vicodin [Hydrocodone-Acetaminophen] Hives    hives                Today's Vital Signs     BP Pulse Height Weight BMI    98/70 mmHg 87 5' 0.5\" (1.537 m) 133 lb (60.328 kg) 25.54 kg/m2         Current Medications          This list is accurate as of: 6/16/ Support Staff. Remember, [email or web address] is NOT to be used for urgent needs. For medical emergencies, dial 911. Visit [email or web address]. [email or web address] to learn more.            Visit [facility] online at  [facility].tn

## (undated) NOTE — MR AVS SNAPSHOT
[address], [address]  [address] [facility]  [phone]               Thank you for choosing us for your health care visit with Drew Arteaga MD.  We are glad to serve you and happy to provide you with this summ Take 1 tablet (500 mg total) by mouth 4 (four) times daily with meals and nightly. Commonly known as:  E-MYCIN           ondansetron 4 MG Tbdp   Take 1 tablet (4 mg total) by mouth every 8 (eight) hours as needed for Nausea.    What changed:    - how much Water is best for hydration Fast food. Eat at home when possible     Tips for increasing your physical activity – Adults who are physically active are less likely to develop some chronic diseases than adults who are inactive.      HOW TO GET STARTED: HOW

## (undated) NOTE — LETTER
3/20/2019          To Whom It May Concern:    Darrius Bey is currently under my medical care and may not return to work at this time. She may return to work on 3/25/19. Activity is restricted as follows: none.     If you require additional informa